# Patient Record
Sex: MALE | URBAN - METROPOLITAN AREA
[De-identification: names, ages, dates, MRNs, and addresses within clinical notes are randomized per-mention and may not be internally consistent; named-entity substitution may affect disease eponyms.]

---

## 2019-02-13 ENCOUNTER — IMPORTED ENCOUNTER (OUTPATIENT)
Dept: URBAN - METROPOLITAN AREA CLINIC 9 | Facility: CLINIC | Age: 84
End: 2019-02-13

## 2019-03-13 ENCOUNTER — IMPORTED ENCOUNTER (OUTPATIENT)
Dept: URBAN - METROPOLITAN AREA CLINIC 9 | Facility: CLINIC | Age: 84
End: 2019-03-13

## 2019-03-18 ENCOUNTER — IMPORTED ENCOUNTER (OUTPATIENT)
Dept: URBAN - METROPOLITAN AREA CLINIC 9 | Facility: CLINIC | Age: 84
End: 2019-03-18

## 2019-03-28 ENCOUNTER — IMPORTED ENCOUNTER (OUTPATIENT)
Dept: URBAN - METROPOLITAN AREA CLINIC 9 | Facility: CLINIC | Age: 84
End: 2019-03-28

## 2019-04-03 ENCOUNTER — IMPORTED ENCOUNTER (OUTPATIENT)
Dept: URBAN - METROPOLITAN AREA CLINIC 9 | Facility: CLINIC | Age: 84
End: 2019-04-03

## 2019-04-25 ENCOUNTER — IMPORTED ENCOUNTER (OUTPATIENT)
Dept: URBAN - METROPOLITAN AREA CLINIC 9 | Facility: CLINIC | Age: 84
End: 2019-04-25

## 2019-11-07 ENCOUNTER — IMPORTED ENCOUNTER (OUTPATIENT)
Dept: URBAN - METROPOLITAN AREA CLINIC 9 | Facility: CLINIC | Age: 84
End: 2019-11-07

## 2020-11-16 ENCOUNTER — IMPORTED ENCOUNTER (OUTPATIENT)
Dept: URBAN - METROPOLITAN AREA CLINIC 9 | Facility: CLINIC | Age: 85
End: 2020-11-16

## 2020-12-03 ENCOUNTER — IMPORTED ENCOUNTER (OUTPATIENT)
Dept: URBAN - METROPOLITAN AREA CLINIC 9 | Facility: CLINIC | Age: 85
End: 2020-12-03

## 2021-01-04 ENCOUNTER — IMPORTED ENCOUNTER (OUTPATIENT)
Dept: URBAN - METROPOLITAN AREA CLINIC 9 | Facility: CLINIC | Age: 86
End: 2021-01-04

## 2021-08-25 NOTE — PATIENT DISCUSSION
IOP stable. Will order [facility] [place] annually.

## 2021-10-16 ASSESSMENT — TONOMETRY
OD_IOP_MMHG: 17
OD_IOP_MMHG: 17
OS_IOP_MMHG: 11
OD_IOP_MMHG: 31
OS_IOP_MMHG: 16
OD_IOP_MMHG: 27
OD_IOP_MMHG: 10
OD_IOP_MMHG: 28
OS_IOP_MMHG: 18
OS_IOP_MMHG: 18
OS_IOP_MMHG: 31
OD_IOP_MMHG: 15
OS_IOP_MMHG: 19
OD_IOP_MMHG: 14
OS_IOP_MMHG: 34
OS_IOP_MMHG: 25
OS_IOP_MMHG: 11
OS_IOP_MMHG: 17
OD_IOP_MMHG: 25
OD_IOP_MMHG: 16
OS_IOP_MMHG: 15
OD_IOP_MMHG: 15
OD_IOP_MMHG: 18

## 2021-10-16 ASSESSMENT — VISUAL ACUITY
OS_SC: 20/20 SN
OD_PH: 20/25 -2 SN
OS_CC: 20/25 -3 SN
OD_CC: 20/20 - SN
OD_CC: 20/20 SN
OD_SC: 20/30 + SN
OS_SC: 20/25 + SN
OD_PH: 20/25 + SN
OS_SC: 20/40 +2 SN
OD_CC: 20/20 -2 SN
OS_CC: 20/20 SN
OS_SC: 20/50 SN
OS_SC: 20/20 -2 SN
OS_CC: 20/20 -2 SN
OS_SC: 20/20 SN
OD_SC: 20/50 SN
OS_CC: 20/25 -2 SN
OS_SC: 20/30 + SN
OD_SC: 20/80 + SN
OS_SC: 20/25 -2 SN
OD_SC: 20/70 -2 SN
OD_SC: 20/50 SN
OD_CC: 20/20 -2 SN
OD_CC: 20/30 SN
OD_PH: 20/25 -2 SN
OS_CC: 20/30 SN
OD_SC: 20/70 + SN
OD_CC: 20/20 SN
OD_SC: 20/60 -2 SN
OD_SC: 20/80 SN
OD_PH: 20/40 SN
OD_SC: 20/25 -2 SN
OS_SC: 20/30 SN
OS_SC: 20/25 -2 SN
OD_SC: 20/60 SN

## 2023-04-25 ENCOUNTER — COMPREHENSIVE EXAM (OUTPATIENT)
Dept: URBAN - METROPOLITAN AREA CLINIC 6 | Facility: CLINIC | Age: 88
End: 2023-04-25

## 2023-04-25 DIAGNOSIS — H11.041: ICD-10-CM

## 2023-04-25 DIAGNOSIS — H40.013: ICD-10-CM

## 2023-04-25 DIAGNOSIS — Z96.1: ICD-10-CM

## 2023-04-25 DIAGNOSIS — H04.123: ICD-10-CM

## 2023-04-25 DIAGNOSIS — H26.492: ICD-10-CM

## 2023-04-25 PROCEDURE — 92133 CPTRZD OPH DX IMG PST SGM ON: CPT

## 2023-04-25 PROCEDURE — 92014 COMPRE OPH EXAM EST PT 1/>: CPT

## 2023-04-25 ASSESSMENT — VISUAL ACUITY
OD_SC: 20/25-2
OS_SC: 20/30-2

## 2023-04-25 ASSESSMENT — TONOMETRY
OD_IOP_MMHG: 13
OS_IOP_MMHG: 14

## 2023-06-15 ENCOUNTER — POST-OP (OUTPATIENT)
Dept: URBAN - METROPOLITAN AREA CLINIC 6 | Facility: CLINIC | Age: 88
End: 2023-06-15

## 2023-06-15 DIAGNOSIS — Z98.890: ICD-10-CM

## 2023-06-15 PROCEDURE — 99024 POSTOP FOLLOW-UP VISIT: CPT

## 2023-06-15 ASSESSMENT — VISUAL ACUITY
OD_SC: 20/30-2
OS_SC: 20/25

## 2023-06-15 ASSESSMENT — TONOMETRY
OD_IOP_MMHG: 13
OS_IOP_MMHG: 13

## 2024-04-17 ENCOUNTER — ESTABLISHED PATIENT (OUTPATIENT)
Dept: URBAN - METROPOLITAN AREA CLINIC 10 | Facility: CLINIC | Age: 89
End: 2024-04-17

## 2024-04-17 DIAGNOSIS — H31.093: ICD-10-CM

## 2024-04-17 DIAGNOSIS — Z96.1: ICD-10-CM

## 2024-04-17 DIAGNOSIS — H52.223: ICD-10-CM

## 2024-04-17 PROCEDURE — 92014 COMPRE OPH EXAM EST PT 1/>: CPT

## 2024-04-17 PROCEDURE — 92015 DETERMINE REFRACTIVE STATE: CPT

## 2024-04-17 PROCEDURE — 92250 FUNDUS PHOTOGRAPHY W/I&R: CPT

## 2024-04-17 ASSESSMENT — TONOMETRY
OD_IOP_MMHG: 22
OS_IOP_MMHG: 22

## 2024-04-17 ASSESSMENT — KERATOMETRY
OS_K2POWER_DIOPTERS: 46.25
OD_AXISANGLE_DEGREES: 41
OS_AXISANGLE2_DEGREES: 5
OD_K2POWER_DIOPTERS: 46.25
OS_AXISANGLE_DEGREES: 95
OD_K1POWER_DIOPTERS: 45.25
OS_K1POWER_DIOPTERS: 45.50
OD_AXISANGLE2_DEGREES: 131

## 2024-04-17 ASSESSMENT — VISUAL ACUITY
OD_SC: 20/40+2
OS_SC: 20/25
OU_SC: 20/25

## 2025-04-18 ENCOUNTER — COMPREHENSIVE EXAM (OUTPATIENT)
Age: 89
End: 2025-04-18

## 2025-04-18 DIAGNOSIS — Z96.1: ICD-10-CM

## 2025-04-18 DIAGNOSIS — H52.223: ICD-10-CM

## 2025-04-18 DIAGNOSIS — H31.093: ICD-10-CM

## 2025-04-18 PROCEDURE — 92014 COMPRE OPH EXAM EST PT 1/>: CPT

## 2025-04-18 PROCEDURE — 92015 DETERMINE REFRACTIVE STATE: CPT

## 2025-04-18 PROCEDURE — 92250 FUNDUS PHOTOGRAPHY W/I&R: CPT
